# Patient Record
Sex: FEMALE | URBAN - METROPOLITAN AREA
[De-identification: names, ages, dates, MRNs, and addresses within clinical notes are randomized per-mention and may not be internally consistent; named-entity substitution may affect disease eponyms.]

---

## 2020-08-28 ENCOUNTER — OFFICE VISIT (OUTPATIENT)
Dept: FAMILY MEDICINE CLINIC | Facility: CLINIC | Age: 40
End: 2020-08-28
Payer: COMMERCIAL

## 2020-08-28 VITALS
BODY MASS INDEX: 22.86 KG/M2 | OXYGEN SATURATION: 100 % | WEIGHT: 129 LBS | HEART RATE: 80 BPM | HEIGHT: 63 IN | RESPIRATION RATE: 20 BRPM | DIASTOLIC BLOOD PRESSURE: 72 MMHG | SYSTOLIC BLOOD PRESSURE: 118 MMHG

## 2020-08-28 DIAGNOSIS — E55.9 VITAMIN D DEFICIENCY: ICD-10-CM

## 2020-08-28 DIAGNOSIS — Z00.00 ANNUAL PHYSICAL EXAM: Primary | ICD-10-CM

## 2020-08-28 DIAGNOSIS — E03.9 HYPOTHYROIDISM, UNSPECIFIED TYPE: ICD-10-CM

## 2020-08-28 PROCEDURE — 3078F DIAST BP <80 MM HG: CPT | Performed by: FAMILY MEDICINE

## 2020-08-28 PROCEDURE — 99385 PREV VISIT NEW AGE 18-39: CPT | Performed by: FAMILY MEDICINE

## 2020-08-28 PROCEDURE — 3008F BODY MASS INDEX DOCD: CPT | Performed by: FAMILY MEDICINE

## 2020-08-28 PROCEDURE — 3074F SYST BP LT 130 MM HG: CPT | Performed by: FAMILY MEDICINE

## 2020-08-28 RX ORDER — LEVOTHYROXINE SODIUM 50 MCG
TABLET ORAL
COMMUNITY
Start: 2020-07-27 | End: 2020-08-28

## 2020-08-28 RX ORDER — LEVOTHYROXINE SODIUM 50 MCG
50 TABLET ORAL
Qty: 90 TABLET | Refills: 3 | Status: SHIPPED | OUTPATIENT
Start: 2020-08-28 | End: 2021-06-22

## 2020-08-28 NOTE — H&P
HPI:   Giovanni Mahmood is a 47year old female who presents for a well woman exam. Symptoms: denies discharge, itching, burning or dysuria. Patient's last menstrual period was 08/21/2020.   Previous pap: UTD  Performs SBEs:yes                        Pt co denies excessive thirst, denies significant weight change  ALL/ASTHMA: denies hx of allergy or asthma    EXAM:   /72   Pulse 80   Resp 20   Ht 63\"   Wt 129 lb (58.5 kg)   LMP 08/21/2020   SpO2 100%   BMI 22.85 kg/m²       Body mass index is 22.85 kg

## 2021-06-22 DIAGNOSIS — E03.9 HYPOTHYROIDISM, UNSPECIFIED TYPE: ICD-10-CM

## 2021-06-24 ENCOUNTER — TELEPHONE (OUTPATIENT)
Dept: FAMILY MEDICINE CLINIC | Facility: CLINIC | Age: 41
End: 2021-06-24

## 2021-06-24 RX ORDER — LEVOTHYROXINE SODIUM 50 MCG
50 TABLET ORAL
Qty: 90 TABLET | Refills: 0 | Status: SHIPPED | OUTPATIENT
Start: 2021-06-24 | End: 2021-09-14

## 2021-06-24 NOTE — TELEPHONE ENCOUNTER
Jamaal Ibarra from [employer] wants to offer the patient synthroid brand name with the cost of a generic copy     Please advise ref # [id number]

## 2021-06-24 NOTE — TELEPHONE ENCOUNTER
Called back. Spoke to [employer] and said they will waive the brand fee and provide pt brand Synthroid at generic cost. I gave verbal auth for it.

## 2021-09-13 DIAGNOSIS — E03.9 HYPOTHYROIDISM, UNSPECIFIED TYPE: ICD-10-CM

## 2021-09-13 NOTE — TELEPHONE ENCOUNTER
SYNTHROID 50 MCG Oral Tab       Missed her August appt. Can't get in until the end of October.   She needs [facility]

## 2021-09-14 RX ORDER — LEVOTHYROXINE SODIUM 50 MCG
50 TABLET ORAL
Qty: 90 TABLET | Refills: 0 | Status: SHIPPED | OUTPATIENT
Start: 2021-09-14 | End: 2021-09-25

## 2021-09-24 DIAGNOSIS — E03.9 HYPOTHYROIDISM, UNSPECIFIED TYPE: ICD-10-CM

## 2021-09-25 RX ORDER — LEVOTHYROXINE SODIUM 50 MCG
50 TABLET ORAL
Qty: 90 TABLET | Refills: 0 | Status: SHIPPED | OUTPATIENT
Start: 2021-09-25 | End: 2022-01-11

## 2021-10-27 ENCOUNTER — OFFICE VISIT (OUTPATIENT)
Dept: FAMILY MEDICINE CLINIC | Facility: CLINIC | Age: 41
End: 2021-10-27
Payer: COMMERCIAL

## 2021-10-27 VITALS
BODY MASS INDEX: 25.35 KG/M2 | WEIGHT: 136 LBS | OXYGEN SATURATION: 98 % | RESPIRATION RATE: 16 BRPM | SYSTOLIC BLOOD PRESSURE: 110 MMHG | HEIGHT: 61.5 IN | HEART RATE: 77 BPM | DIASTOLIC BLOOD PRESSURE: 66 MMHG

## 2021-10-27 DIAGNOSIS — Z12.31 ENCOUNTER FOR SCREENING MAMMOGRAM FOR HIGH-RISK PATIENT: ICD-10-CM

## 2021-10-27 DIAGNOSIS — E56.9 VITAMIN DEFICIENCY: ICD-10-CM

## 2021-10-27 DIAGNOSIS — Z00.00 ANNUAL PHYSICAL EXAM: Primary | ICD-10-CM

## 2021-10-27 PROCEDURE — 99395 PREV VISIT EST AGE 18-39: CPT | Performed by: FAMILY MEDICINE

## 2021-10-27 PROCEDURE — 3074F SYST BP LT 130 MM HG: CPT | Performed by: FAMILY MEDICINE

## 2021-10-27 PROCEDURE — 3078F DIAST BP <80 MM HG: CPT | Performed by: FAMILY MEDICINE

## 2021-10-27 PROCEDURE — 3008F BODY MASS INDEX DOCD: CPT | Performed by: FAMILY MEDICINE

## 2021-10-27 NOTE — PROGRESS NOTES
HPI:   Sanket Zamarripa is a 46year old female who presents for a complete physical exam.       Wt Readings from Last 6 Encounters:  10/27/21 : 136 lb (61.7 kg)  08/28/20 : 129 lb (58.5 kg)    Body mass index is 25.28 kg/m².      No results found for: CHOL nourished,in no apparent distress  CARDIO: RRR without murmur  LUNGS: clear to auscultation  NECK: supple,no adenopathy,no thyromegally  HEENT: atraumatic, normocephalic,ears and throat are clear  EYES:PERRLA, EOMI, normal,conjunctiva are clear  SKIN: Sheldon Jose

## 2022-01-11 DIAGNOSIS — E03.9 HYPOTHYROIDISM, UNSPECIFIED TYPE: ICD-10-CM

## 2022-01-11 LAB
ABSOLUTE BASOPHILS: 33 CELLS/UL (ref 0–200)
ABSOLUTE EOSINOPHILS: 119 CELLS/UL (ref 15–500)
ABSOLUTE LYMPHOCYTES: 2765 CELLS/UL (ref 850–3900)
ABSOLUTE MONOCYTES: 442 CELLS/UL (ref 200–950)
ABSOLUTE NEUTROPHILS: 3241 CELLS/UL (ref 1500–7800)
ALBUMIN/GLOBULIN RATIO: 1.5 (CALC) (ref 1–2.5)
ALBUMIN: 4.5 G/DL (ref 3.6–5.1)
ALKALINE PHOSPHATASE: 100 U/L (ref 31–125)
ALT: 13 U/L (ref 6–29)
AST: 16 U/L (ref 10–30)
BASOPHILS: 0.5 %
BILIRUBIN, TOTAL: 0.6 MG/DL (ref 0.2–1.2)
BUN: 11 MG/DL (ref 7–25)
CALCIUM: 9.6 MG/DL (ref 8.6–10.2)
CARBON DIOXIDE: 26 MMOL/L (ref 20–32)
CHLORIDE: 103 MMOL/L (ref 98–110)
CHOL/HDLC RATIO: 5.3 (CALC)
CHOLESTEROL, TOTAL: 227 MG/DL
CREATININE: 0.79 MG/DL (ref 0.5–1.1)
EGFR IF AFRICN AM: 109 ML/MIN/1.73M2
EGFR IF NONAFRICN AM: 94 ML/MIN/1.73M2
EOSINOPHILS: 1.8 %
GLOBULIN: 3 G/DL (CALC) (ref 1.9–3.7)
GLUCOSE: 81 MG/DL (ref 65–99)
HDL CHOLESTEROL: 43 MG/DL
HEMATOCRIT: 30 % (ref 35–45)
HEMOGLOBIN: 10.1 G/DL (ref 11.7–15.5)
LDL-CHOLESTEROL: 161 MG/DL (CALC)
LYMPHOCYTES: 41.9 %
MCH: 26.6 PG (ref 27–33)
MCHC: 33.7 G/DL (ref 32–36)
MCV: 78.9 FL (ref 80–100)
MONOCYTES: 6.7 %
MPV: 9.2 FL (ref 7.5–12.5)
NEUTROPHILS: 49.1 %
NON-HDL CHOLESTEROL: 184 MG/DL (CALC)
PLATELET COUNT: 356 THOUSAND/UL (ref 140–400)
POTASSIUM: 3.9 MMOL/L (ref 3.5–5.3)
PROTEIN, TOTAL: 7.5 G/DL (ref 6.1–8.1)
RDW: 13.1 % (ref 11–15)
RED BLOOD CELL COUNT: 3.8 MILLION/UL (ref 3.8–5.1)
SODIUM: 137 MMOL/L (ref 135–146)
T4, FREE: 1.2 NG/DL (ref 0.8–1.8)
TRIGLYCERIDES: 115 MG/DL
TSH: 2.16 MIU/L
VITAMIN B12: 269 PG/ML (ref 200–1100)
VITAMIN D, 25-OH, TOTAL: 11 NG/ML (ref 30–100)
WHITE BLOOD CELL COUNT: 6.6 THOUSAND/UL (ref 3.8–10.8)

## 2022-01-11 RX ORDER — LEVOTHYROXINE SODIUM 50 MCG
50 TABLET ORAL
Qty: 90 TABLET | Refills: 0 | Status: SHIPPED | OUTPATIENT
Start: 2022-01-11

## 2022-01-11 NOTE — TELEPHONE ENCOUNTER
Patient called to check on her refill. Explained Policy  Patient stated she has no medication for tomorrow. Please advise.

## 2022-01-11 NOTE — TELEPHONE ENCOUNTER
Last refill:  SYNTHROID 50 MCG Oral Tab 90 tablet 0 9/25/2021    Sig:   Take 1 tablet (50 mcg total) by mouth before breakfast       Last lab:   1/10/22  9:08 AM   TSH   mIU/L 2.16      Approve/deny:

## 2022-04-11 RX ORDER — LEVOTHYROXINE SODIUM 50 MCG
TABLET ORAL
Qty: 90 TABLET | Refills: 0 | Status: SHIPPED | OUTPATIENT
Start: 2022-04-11

## 2022-04-11 RX ORDER — LEVOTHYROXINE SODIUM 50 MCG
TABLET ORAL
Qty: 90 TABLET | Refills: 0 | Status: SHIPPED | OUTPATIENT
Start: 2022-04-11 | End: 2022-04-11

## 2022-07-11 DIAGNOSIS — E03.9 HYPOTHYROIDISM, UNSPECIFIED TYPE: ICD-10-CM

## 2022-07-11 RX ORDER — LEVOTHYROXINE SODIUM 50 MCG
TABLET ORAL
Qty: 90 TABLET | Refills: 0 | Status: SHIPPED | OUTPATIENT
Start: 2022-07-11

## 2022-10-16 DIAGNOSIS — E03.9 HYPOTHYROIDISM, UNSPECIFIED TYPE: ICD-10-CM

## 2022-10-17 DIAGNOSIS — E03.9 HYPOTHYROIDISM, UNSPECIFIED TYPE: ICD-10-CM

## 2022-10-17 RX ORDER — LEVOTHYROXINE SODIUM 50 MCG
TABLET ORAL
Qty: 90 TABLET | Refills: 0 | Status: SHIPPED | OUTPATIENT
Start: 2022-10-17

## 2022-10-17 RX ORDER — LEVOTHYROXINE SODIUM 50 MCG
TABLET ORAL
Qty: 90 TABLET | Refills: 0 | Status: SHIPPED | OUTPATIENT
Start: 2022-10-17 | End: 2022-10-17

## 2023-07-29 DIAGNOSIS — E03.9 HYPOTHYROIDISM, UNSPECIFIED TYPE: ICD-10-CM

## 2023-07-31 DIAGNOSIS — E03.9 HYPOTHYROIDISM, UNSPECIFIED TYPE: ICD-10-CM

## 2023-07-31 RX ORDER — LEVOTHYROXINE SODIUM 50 MCG
TABLET ORAL
Qty: 90 TABLET | Refills: 0 | OUTPATIENT
Start: 2023-07-31

## 2023-08-02 RX ORDER — LEVOTHYROXINE SODIUM 50 MCG
50 TABLET ORAL
Qty: 90 TABLET | Refills: 0 | OUTPATIENT
Start: 2023-08-02

## 2023-08-24 ENCOUNTER — OFFICE VISIT (OUTPATIENT)
Dept: FAMILY MEDICINE CLINIC | Facility: CLINIC | Age: 43
End: 2023-08-24
Payer: COMMERCIAL

## 2023-08-24 VITALS
HEIGHT: 62 IN | HEART RATE: 86 BPM | WEIGHT: 134 LBS | RESPIRATION RATE: 16 BRPM | SYSTOLIC BLOOD PRESSURE: 118 MMHG | OXYGEN SATURATION: 100 % | DIASTOLIC BLOOD PRESSURE: 76 MMHG | BODY MASS INDEX: 24.66 KG/M2

## 2023-08-24 DIAGNOSIS — E03.9 HYPOTHYROIDISM, UNSPECIFIED TYPE: ICD-10-CM

## 2023-08-24 DIAGNOSIS — Z12.31 VISIT FOR SCREENING MAMMOGRAM: ICD-10-CM

## 2023-08-24 DIAGNOSIS — Z12.4 SCREENING FOR CERVICAL CANCER: ICD-10-CM

## 2023-08-24 DIAGNOSIS — Z00.00 ROUTINE GENERAL MEDICAL EXAMINATION AT A HEALTH CARE FACILITY: Primary | ICD-10-CM

## 2023-08-24 NOTE — PROGRESS NOTES
Subjective:   Patient ID: Milo Fregoso is a 41year old female. HPI  Patient presents today requesting physical exam.      Diet: well balanced, vegetarian  Exercise: regular activity, walking  Tobacco use: denies  Drug use: denies  Alcohol use: denies  Sexually active: with spouse  LMP: 8/10/23  Marital status: , 2 children ages 8 and 9  Occupation:     Health maintenance:  Pap/Hpv: 2/2/18 negative  Mammogram: never  Performs SBEs: yes  Dexa scan: never  Colonoscopy: never  Discussed age appropriate vaccines    Ran out of thyroid med 3 weeks ago  Was originally placed on the med to help with fertility and just never stopped it afterward  Wonders if she could stay off of it      History/Other:   Review of Systems   Constitutional:  Negative for chills, fatigue and fever. HENT:  Negative for congestion, ear pain, rhinorrhea and sore throat. Eyes:  Negative for visual disturbance. Respiratory:  Negative for cough, shortness of breath and wheezing. Cardiovascular:  Negative for chest pain, palpitations and leg swelling. Gastrointestinal:  Negative for abdominal pain, diarrhea, nausea and vomiting. Genitourinary:  Negative for dysuria, frequency and hematuria. Musculoskeletal:  Negative for arthralgias, gait problem and myalgias. Skin:  Negative for rash. Neurological:  Negative for weakness, light-headedness and headaches. Hematological:  Negative for adenopathy. Psychiatric/Behavioral:  Negative for dysphoric mood. The patient is not nervous/anxious. Current Outpatient Medications   Medication Sig Dispense Refill    SYNTHROID 50 MCG Oral Tab TAKE 1 TABLET(50 MCG) BY MOUTH BEFORE BREAKFAST 90 tablet 0     Allergies:No Known Allergies    Objective:   Physical Exam  Vitals and nursing note reviewed. Constitutional:       General: She is not in acute distress. Appearance: She is well-developed. HENT:      Head: Normocephalic and atraumatic.       Right Ear: Tympanic membrane and external ear normal.      Left Ear: Tympanic membrane and external ear normal.      Nose: Nose normal.      Mouth/Throat:      Mouth: Mucous membranes are moist.   Eyes:      Extraocular Movements: Extraocular movements intact. Conjunctiva/sclera: Conjunctivae normal.      Pupils: Pupils are equal, round, and reactive to light. Neck:      Thyroid: No thyromegaly. Cardiovascular:      Rate and Rhythm: Normal rate and regular rhythm. Heart sounds: Normal heart sounds. Pulmonary:      Effort: Pulmonary effort is normal.      Breath sounds: Normal breath sounds. No wheezing or rales. Abdominal:      General: Bowel sounds are normal. There is no distension. Palpations: Abdomen is soft. There is no mass. Tenderness: There is no abdominal tenderness. Musculoskeletal:         General: No tenderness. Normal range of motion. Cervical back: Normal range of motion and neck supple. Lymphadenopathy:      Cervical: No cervical adenopathy. Skin:     General: Skin is warm and dry. Findings: No rash. Neurological:      General: No focal deficit present. Mental Status: She is alert and oriented to person, place, and time. Psychiatric:         Mood and Affect: Mood normal.         Behavior: Behavior normal.         Assessment & Plan:   1. Routine general medical examination at a health care facility  Patient is generally healthy. Physical exam is unremarkable. Check fasting labs. Health maintenance issues discussed. Encouraged routine vaccines. Advised healthy diet and regular exercise. Annual physicals. - CBC WITH DIFFERENTIAL WITH PLATELET  - COMP METABOLIC PANEL (14)  - LIPID PANEL  - TSH W REFLEX TO FREE T4  - VITAMIN B12  - VITAMIN D, 25-HYDROXY    2. Screening for cervical cancer  - THINPREP PAP AND HPV MRNA E6/E7 REFLEX HPV 16,18/45    3. Hypothyroidism, unspecified type  Recheck TSH and if normal remain off medication.      4. Visit for screening mammogram  - [facility] ELIZ 2D+3D SCREENING BILAT (CPT=77067/79906);  Future

## 2023-08-29 LAB
HPV 16 RNA: NOT DETECTED
HPV 18/45 RNA: NOT DETECTED
HPV MRNA E6/E7: DETECTED

## 2024-10-10 ENCOUNTER — OFFICE VISIT (OUTPATIENT)
Dept: FAMILY MEDICINE CLINIC | Facility: CLINIC | Age: 44
End: 2024-10-10
Payer: COMMERCIAL

## 2024-10-10 VITALS
BODY MASS INDEX: 24.84 KG/M2 | RESPIRATION RATE: 16 BRPM | WEIGHT: 135 LBS | DIASTOLIC BLOOD PRESSURE: 74 MMHG | SYSTOLIC BLOOD PRESSURE: 108 MMHG | HEART RATE: 73 BPM | HEIGHT: 62 IN | OXYGEN SATURATION: 100 %

## 2024-10-10 DIAGNOSIS — B97.7 HPV (HUMAN PAPILLOMA VIRUS) INFECTION: ICD-10-CM

## 2024-10-10 DIAGNOSIS — Z00.00 ANNUAL PHYSICAL EXAM: Primary | ICD-10-CM

## 2024-10-10 DIAGNOSIS — Z12.31 ENCOUNTER FOR SCREENING MAMMOGRAM FOR HIGH-RISK PATIENT: ICD-10-CM

## 2024-10-10 DIAGNOSIS — Z01.419 WELL WOMAN EXAM WITH ROUTINE GYNECOLOGICAL EXAM: ICD-10-CM

## 2024-10-10 DIAGNOSIS — R01.1 NEWLY RECOGNIZED HEART MURMUR: ICD-10-CM

## 2024-10-10 PROCEDURE — 99396 PREV VISIT EST AGE 40-64: CPT | Performed by: FAMILY MEDICINE

## 2024-10-10 NOTE — PROGRESS NOTES
HPI:   Rin Hammond is a 44 year old female who presents for a complete physical exam.       Wt Readings from Last 6 Encounters:   10/10/24 135 lb (61.2 kg)   08/24/23 134 lb (60.8 kg)   10/27/21 136 lb (61.7 kg)   08/28/20 129 lb (58.5 kg)     Body mass index is 24.69 kg/m².     CHOLESTEROL, TOTAL (mg/dL)   Date Value   01/10/2022 227 (H)     HDL CHOLESTEROL (mg/dL)   Date Value   01/10/2022 43 (L)     LDL-CHOLESTEROL (mg/dL (calc))   Date Value   01/10/2022 161 (H)     AST (U/L)   Date Value   01/10/2022 16     ALT (U/L)   Date Value   01/10/2022 13     last pap- due   H/o HPV   No vaginal discharge  No bladder dysfunction  Regular menses  birth control-condoms   + performing self breast exams  last mammogram- due   Dexa - never   C-scope - never   No calcium and vit D supplementation  No family history of breast colon or ovarian cancer      Current Outpatient Medications   Medication Sig Dispense Refill    SYNTHROID 50 MCG Oral Tab TAKE 1 TABLET(50 MCG) BY MOUTH BEFORE BREAKFAST 90 tablet 0      No past medical history on file.   No past surgical history on file.   No family history on file.   Social History:   Social History     Socioeconomic History    Marital status:    Tobacco Use    Smoking status: Never    Smokeless tobacco: Never   Substance and Sexual Activity    Alcohol use: Never    Drug use: Never     Occ: . : . Children: 2    Exercise: minimal.  Diet: watches minimally     REVIEW OF SYSTEMS:   GENERAL: feels well otherwise  SKIN: denies any unusual skin lesions  EYES:denies blurred vision or double vision  HEENT: denies nasal congestion, sinus pain or ST  LUNGS: denies shortness of breath with exertion  CARDIOVASCULAR: denies chest pain on exertion  GI: denies abdominal pain,denies heartburn  : denies dysuria, vaginal discharge or itching,periods regular   MUSCULOSKELETAL: denies back pain  NEURO: denies headaches  PSYCHE: denies depression or anxiety  HEMATOLOGIC:  denies hx of anemia  ENDOCRINE: thyroid history; WENT OFF MEDS IN JULY   ALL/ASTHMA: denies hx of allergy or asthma    EXAM:   /74   Pulse 73   Resp 16   Ht 5' 2\" (1.575 m)   Wt 135 lb (61.2 kg)   LMP 10/01/2024   SpO2 100%   BMI 24.69 kg/m²   Body mass index is 24.69 kg/m².   GENERAL: alert and oriented X 3, well developed, well nourished,in no apparent distress  CARDIO: RRR 3/6 murmur  LUNGS: clear to auscultation  NECK: supple,no adenopathy,no thyromegally  HEENT: atraumatic, normocephalic,ears and throat are clear  EYES:PERRLA, EOMI, normal,conjunctiva are clear  SKIN: norashes,no suspicious lesions  GI: good BS's,no masses, HSM or tenderness  CHEST: no chest tenderness  BREAST: no axillary LAD, no masses no nipple discharge bilaterally  : external genitalia - no inguinal LAD, no lesions.    Speculum exam- introitus is normal,scant discharge,cervix is pink.   Bimanual exam- no adnexal masses or tenderness  RECTAL:good rectal tone,no mass, brown stool, stool is OB negative   MUSCULOSKELETAL: back is not tender,FROM of the back  EXTREMITIES: no cyanosis, clubbing or edema  NEURO: cranial nerves are intact,motor and sensory are grossly intact    ASSESSMENT AND PLAN:   Rin Hammond is a 44 year old female who presents     1. Annual physical exam    - VITAMIN D, 25-HYDROXY [17167][Q]  - FERRITIN [457] [Q]  - Free T4, (Free Thyroxine)  - Assay, Thyroid Stim Hormone  - Lipid Panel  - CBC With Differential With Platelet  - Vitamin B12  - Comp Metabolic Panel (14)  - Hemoglobin A1C    2. Well woman exam with routine gynecological exam    3. HPV (human papilloma virus) infection    4. Encounter for screening mammogram for high-risk patient    - [facility] ELIZ 2D+3D SCREENING BILAT (CPT=77067/56689); Future      OPEN TO THYROID MEDS AGAIN / check echo     Discussed diet, exercise,calcium, vitamin D, fish oil and self breast exams.   Questions answered and patient indicates understanding of these issues and  agrees to the plan.  Follow up in 1 year or sooner if needed.

## 2024-10-15 LAB
ABSOLUTE BASOPHILS: 39 CELLS/UL (ref 0–200)
ABSOLUTE EOSINOPHILS: 59 CELLS/UL (ref 15–500)
ABSOLUTE LYMPHOCYTES: 2756 CELLS/UL (ref 850–3900)
ABSOLUTE MONOCYTES: 299 CELLS/UL (ref 200–950)
ABSOLUTE NEUTROPHILS: 3348 CELLS/UL (ref 1500–7800)
ALBUMIN/GLOBULIN RATIO: 1.5 (CALC) (ref 1–2.5)
ALBUMIN: 4.5 G/DL (ref 3.6–5.1)
ALKALINE PHOSPHATASE: 113 U/L (ref 31–125)
ALT: 9 U/L (ref 6–29)
AST: 12 U/L (ref 10–30)
BASOPHILS: 0.6 %
BILIRUBIN, TOTAL: 0.4 MG/DL (ref 0.2–1.2)
BUN: 12 MG/DL (ref 7–25)
CALCIUM: 9.7 MG/DL (ref 8.6–10.2)
CARBON DIOXIDE: 25 MMOL/L (ref 20–32)
CHLORIDE: 103 MMOL/L (ref 98–110)
CHOL/HDLC RATIO: 5.9 (CALC)
CHOLESTEROL, TOTAL: 241 MG/DL
CREATININE: 0.63 MG/DL (ref 0.5–0.99)
EGFR: 114 ML/MIN/1.73M2
EOSINOPHILS: 0.9 %
FERRITIN: 2 NG/ML (ref 16–232)
GLOBULIN: 3.1 G/DL (CALC) (ref 1.9–3.7)
GLUCOSE: 82 MG/DL (ref 65–99)
HDL CHOLESTEROL: 41 MG/DL
HEMATOCRIT: 30.7 % (ref 35–45)
HEMOGLOBIN A1C: 5.8 % OF TOTAL HGB
HEMOGLOBIN: 9 G/DL (ref 11.7–15.5)
HPV MRNA E6/E7: NOT DETECTED
LDL-CHOLESTEROL: 178 MG/DL (CALC)
LYMPHOCYTES: 42.4 %
MCH: 21.3 PG (ref 27–33)
MCHC: 29.3 G/DL (ref 32–36)
MCV: 72.7 FL (ref 80–100)
MONOCYTES: 4.6 %
MPV: 9.1 FL (ref 7.5–12.5)
NEUTROPHILS: 51.5 %
NON-HDL CHOLESTEROL: 200 MG/DL (CALC)
PLATELET COUNT: 439 THOUSAND/UL (ref 140–400)
POTASSIUM: 4.2 MMOL/L (ref 3.5–5.3)
PROTEIN, TOTAL: 7.6 G/DL (ref 6.1–8.1)
RDW: 16.1 % (ref 11–15)
RED BLOOD CELL COUNT: 4.22 MILLION/UL (ref 3.8–5.1)
SODIUM: 137 MMOL/L (ref 135–146)
T4, FREE: 1.1 NG/DL (ref 0.8–1.8)
TRIGLYCERIDES: 102 MG/DL
TSH: 2.42 MIU/L
VITAMIN B12: 315 PG/ML (ref 200–1100)
VITAMIN D, 25-OH, TOTAL: 8 NG/ML (ref 30–100)
WHITE BLOOD CELL COUNT: 6.5 THOUSAND/UL (ref 3.8–10.8)

## 2024-10-25 ENCOUNTER — TELEMEDICINE (OUTPATIENT)
Dept: FAMILY MEDICINE CLINIC | Facility: CLINIC | Age: 44
End: 2024-10-25
Payer: COMMERCIAL

## 2024-10-25 DIAGNOSIS — E78.00 ELEVATED CHOLESTEROL: ICD-10-CM

## 2024-10-25 DIAGNOSIS — R73.03 PREDIABETES: ICD-10-CM

## 2024-10-25 DIAGNOSIS — E55.9 VITAMIN D DEFICIENCY: ICD-10-CM

## 2024-10-25 DIAGNOSIS — E53.8 B12 DEFICIENCY: ICD-10-CM

## 2024-10-25 DIAGNOSIS — D50.9 IRON DEFICIENCY ANEMIA, UNSPECIFIED IRON DEFICIENCY ANEMIA TYPE: Primary | ICD-10-CM

## 2024-10-25 PROCEDURE — 99214 OFFICE O/P EST MOD 30 MIN: CPT | Performed by: FAMILY MEDICINE

## 2024-10-25 NOTE — PROGRESS NOTES
This visit is conducted using Telemedicine with live, interactive video and audio.    Telehealth outside of [facility]  Telehealth Verbal Consent   I conducted a telehealth visit with Rin Hammond today, 10/25/24, which was completed using two-way, real-time interactive audio and video communication. This has been done in good naomy to provide continuity of care in the best interest of the provider-patient relationship, due to the COVID -19 public health crisis/national emergency where restrictions of face-to-face office visits are ongoing. Every conscious effort was taken to allow for sufficient and adequate time to complete the visit.  The patient was made aware of the limitations of the telehealth visit, including treatment limitations as no physical exam could be performed.  The patient was advised to call 911 or to go to the ER in case there was an emergency.  The patient was also advised of the potential privacy & security concerns related to the telehealth platform.   The patient was made aware of where to find [facility]'s notice of privacy practices, telehealth consent form and other related consent forms and documents.  which are located on the [facility] website. The patient verbally agreed to telehealth consent form, related consents and the risks discussed.    Lastly, the patient confirmed that they were in [place].   Included in this visit, time may have been spent reviewing labs, medications, radiology tests and decision making. Appropriate medical decision-making and tests are ordered as detailed in the plan of care above.  Coding/billing information is submitted for this visit based on complexity of care and/or time spent for the visit.      HPI:   Rin Hammond is a 44 year old female who presents for lab follow up    Pt does not feel menstrual cycle is too heavy   Pt walking daily   Pt feel her energy is ok   Not taking any supplements       Component      Latest Ref Rng 1/10/2022 10/14/2024   WBC      3.8  - 10.8 Thousand/uL 6.6  6.5    RBC      3.80 - 5.10 Million/uL 3.80  4.22    Hemoglobin      11.7 - 15.5 g/dL 10.1 (L)  9.0 (L)    Hematocrit      35.0 - 45.0 % 30.0 (L)  30.7 (L)    MCV      80.0 - 100.0 fL 78.9 (L)  72.7 (L)    MCH      27.0 - 33.0 pg 26.6 (L)  21.3 (L)    MCHC      32.0 - 36.0 g/dL 33.7  29.3 (L)    RDW      11.0 - 15.0 % 13.1  16.1 (H)    Platelet Count      140 - 400 Thousand/uL 356  439 (H)    MPV      7.5 - 12.5 fL 9.2  9.1    Neutrophils Absolute      1,500 - 7,800 cells/uL 3,241  3,348    Lymphocytes Absolute      850 - 3,900 cells/uL 2,765  2,756    Monocytes Absolute      200 - 950 cells/uL 442  299    Eosinophils Absolute      15 - 500 cells/uL 119  59    Basophils Absolute      0 - 200 cells/uL 33  39    Neutrophils %      % 49.1  51.5    Lymphocytes %      % 41.9  42.4    Monocytes %      % 6.7  4.6    Eosinophils %      % 1.8  0.9    Basophils %      % 0.5  0.6    Glucose      65 - 99 mg/dL 81  82    BUN      7 - 25 mg/dL 11  12    CREATININE      0.50 - 0.99 mg/dL 0.79  0.63    eGFR NON-AFR. AMERICAN      > OR = 60 mL/min/1.73m2 94     eGFR       > OR = 60 mL/min/1.73m2 109     BUN/CREATININE RATIO      6 - 22 (calc) NOT APPLICABLE  SEE NOTE:    Sodium      135 - 146 mmol/L 137  137    Potassium      3.5 - 5.3 mmol/L 3.9  4.2    Chloride      98 - 110 mmol/L 103  103    Carbon Dioxide, Total      20 - 32 mmol/L 26  25    CALCIUM      8.6 - 10.2 mg/dL 9.6  9.7    PROTEIN, TOTAL      6.1 - 8.1 g/dL 7.5  7.6    Albumin      3.6 - 5.1 g/dL 4.5  4.5    Globulin      1.9 - 3.7 g/dL (calc) 3.0  3.1    A/G Ratio      1.0 - 2.5 (calc) 1.5  1.5    Total Bilirubin      0.2 - 1.2 mg/dL 0.6  0.4    Alkaline Phosphatase      31 - 125 U/L 100  113    AST (SGOT)      10 - 30 U/L 16  12    ALT (SGPT)      6 - 29 U/L 13  9    EGFR      > OR = 60 mL/min/1.73m2  114    Cholesterol, Total      <200 mg/dL 227 (H)  241 (H)    HDL Cholesterol      > OR = 50 mg/dL 43 (L)  41 (L)     Triglycerides      <150 mg/dL 115  102    LDL Cholesterol Calc      mg/dL (calc) 161 (H)  178 (H)    Chol/HDL Ratio      <5.0 (calc) 5.3 (H)  5.9 (H)    NON-HDL CHOLESTEROL      <130 mg/dL (calc) 184 (H)  200 (H)    T4,Free (Direct)      0.8 - 1.8 ng/dL 1.2  1.1    TSH      mIU/L 2.16  2.42    Vitamin B12      200 - 1,100 pg/mL 269  315    VITAMIN D, 25-OH, TOTAL      30 - 100 ng/mL 11 (L)  8 (L)    FERRITIN      16 - 232 ng/mL  2 (L)    HEMOGLOBIN A1c      <5.7 % of total Hgb  5.8 (H)       Legend:  (L) Low  (H) High    Current Outpatient Medications   Medication Sig Dispense Refill    SYNTHROID 50 MCG Oral Tab TAKE 1 TABLET(50 MCG) BY MOUTH BEFORE BREAKFAST 90 tablet 0      No past medical history on file.   No past surgical history on file.   No family history on file.   Social History:   Social History     Socioeconomic History    Marital status:    Tobacco Use    Smoking status: Never    Smokeless tobacco: Never   Substance and Sexual Activity    Alcohol use: Never    Drug use: Never     Occ: . : . Children: 2    Exercise: minimal.  Diet: watches minimally     REVIEW OF SYSTEMS:   GENERAL: feels well otherwise  SKIN: denies any unusual skin lesions  EYES:denies blurred vision or double vision  HEENT: denies nasal congestion, sinus pain or ST  LUNGS: denies shortness of breath with exertion  CARDIOVASCULAR: denies chest pain on exertion  GI: denies abdominal pain,denies heartburn  : denies dysuria, vaginal discharge or itching,periods regular   MUSCULOSKELETAL: denies back pain  NEURO: denies headaches  PSYCHE: denies depression or anxiety  HEMATOLOGIC: denies hx of anemia  ENDOCRINE: thyroid history; WENT OFF MEDS IN JULY   ALL/ASTHMA: denies hx of allergy or asthma    EXAM:   alert, appears stated age and cooperative, Normocephalic, without obvious abnormality, atraumatic, lips, mucosa, and tongue normal; teeth and gums normal, Speaking in full sentences comfortably, Normal  work of breathing, Skin color, texture, turgor normal. No rashes or lesions and age appropriate, normal, logical connections, person, place and time/date and no suicidal ideation      ASSESSMENT AND PLAN:   Rin Hammond is a 44 year old female who presents   1. Iron deficiency anemia, unspecified iron deficiency anemia type    - DIETITIAN EDUCATION INITIAL, DIET (INTERNAL)  - Oncology/Hematology Referral - In Network  - [facility] US PELVIS (TRANSABDOMINAL AND TRANSVAGINAL) (CPT=76856/21257) [[id number]]; Future  - CBC With Differential With Platelet  - FERRITIN [457] [Q]  - [facility] US PELVIS (TRANSABDOMINAL AND TRANSVAGINAL) (CPT=76856/91438) [[id number]]    2. B12 deficiency  RN visit for monthly B12 injections   - DIETITIAN EDUCATION INITIAL, DIET (INTERNAL)  - Vitamin B12    3. Vitamin D deficiency    - DIETITIAN EDUCATION INITIAL, DIET (INTERNAL)  - VITAMIN D, 25-HYDROXY [71667][Q]    4. Elevated cholesterol    - DIETITIAN EDUCATION INITIAL, DIET (INTERNAL)  - Lipid Panel    5. Prediabetes    - DIETITIAN EDUCATION INITIAL, DIET (INTERNAL)  - Hemoglobin A1C    Labs in 3 months     Questions answered and patient indicates understanding of these issues and agrees to the plan.  Follow up in 3-6 mo or sooner if needed.

## 2024-12-06 ENCOUNTER — TELEPHONE (OUTPATIENT)
Dept: FAMILY MEDICINE CLINIC | Facility: CLINIC | Age: 44
End: 2024-12-06

## 2024-12-06 NOTE — TELEPHONE ENCOUNTER
Per Dr Rush 10/25/24 visit    2. B12 deficiency  RN visit for monthly B12 injections   - DIETITIAN EDUCATION INITIAL, DIET (INTERNAL)  - Vitamin B12

## 2024-12-10 ENCOUNTER — NURSE ONLY (OUTPATIENT)
Dept: FAMILY MEDICINE CLINIC | Facility: CLINIC | Age: 44
End: 2024-12-10
Payer: COMMERCIAL

## 2024-12-10 DIAGNOSIS — E53.8 B12 DEFICIENCY: Primary | ICD-10-CM

## 2024-12-10 PROCEDURE — 96372 THER/PROPH/DIAG INJ SC/IM: CPT | Performed by: FAMILY MEDICINE

## 2024-12-10 RX ORDER — CYANOCOBALAMIN 1000 UG/ML
1000 INJECTION, SOLUTION INTRAMUSCULAR; SUBCUTANEOUS ONCE
Status: COMPLETED | OUTPATIENT
Start: 2024-12-10 | End: 2024-12-10

## 2024-12-10 RX ADMIN — CYANOCOBALAMIN 1000 MCG: 1000 INJECTION, SOLUTION INTRAMUSCULAR; SUBCUTANEOUS at 10:54:00

## 2024-12-19 ENCOUNTER — NURSE ONLY (OUTPATIENT)
Dept: FAMILY MEDICINE CLINIC | Facility: CLINIC | Age: 44
End: 2024-12-19
Payer: COMMERCIAL

## 2024-12-19 DIAGNOSIS — E53.8 B12 DEFICIENCY: Primary | ICD-10-CM

## 2024-12-19 PROCEDURE — 96372 THER/PROPH/DIAG INJ SC/IM: CPT | Performed by: FAMILY MEDICINE

## 2024-12-19 RX ORDER — CYANOCOBALAMIN 1000 UG/ML
1000 INJECTION, SOLUTION INTRAMUSCULAR; SUBCUTANEOUS ONCE
Status: COMPLETED | OUTPATIENT
Start: 2024-12-19 | End: 2024-12-19

## 2024-12-19 RX ADMIN — CYANOCOBALAMIN 1000 MCG: 1000 INJECTION, SOLUTION INTRAMUSCULAR; SUBCUTANEOUS at 11:22:00

## 2024-12-30 ENCOUNTER — APPOINTMENT (OUTPATIENT)
Age: 44
End: 2024-12-30
Attending: INTERNAL MEDICINE
Payer: COMMERCIAL

## 2025-01-14 ENCOUNTER — OFFICE VISIT (OUTPATIENT)
Age: 45
End: 2025-01-14
Attending: INTERNAL MEDICINE
Payer: COMMERCIAL

## 2025-01-14 VITALS
RESPIRATION RATE: 16 BRPM | TEMPERATURE: 98 F | DIASTOLIC BLOOD PRESSURE: 79 MMHG | BODY MASS INDEX: 25.08 KG/M2 | HEIGHT: 62.01 IN | OXYGEN SATURATION: 100 % | WEIGHT: 138 LBS | SYSTOLIC BLOOD PRESSURE: 134 MMHG | HEART RATE: 84 BPM

## 2025-01-14 DIAGNOSIS — D53.8 ANEMIA DUE TO COPPER DEFICIENCY: ICD-10-CM

## 2025-01-14 DIAGNOSIS — D50.9 IRON DEFICIENCY ANEMIA, UNSPECIFIED IRON DEFICIENCY ANEMIA TYPE: ICD-10-CM

## 2025-01-14 DIAGNOSIS — D53.8 ANEMIA DUE TO ZINC DEFICIENCY: Primary | ICD-10-CM

## 2025-01-14 DIAGNOSIS — Z13.29 SCREENING FOR THYROID DISORDER: ICD-10-CM

## 2025-01-14 LAB
ALBUMIN SERPL-MCNC: 4.9 G/DL (ref 3.2–4.8)
ALBUMIN/GLOB SERPL: 1.5 {RATIO} (ref 1–2)
ALP LIVER SERPL-CCNC: 101 U/L
ALT SERPL-CCNC: 10 U/L
ANION GAP SERPL CALC-SCNC: 7 MMOL/L (ref 0–18)
AST SERPL-CCNC: 17 U/L (ref ?–34)
BASOPHILS # BLD AUTO: 0.03 X10(3) UL (ref 0–0.2)
BASOPHILS NFR BLD AUTO: 0.4 %
BILIRUB SERPL-MCNC: 0.5 MG/DL (ref 0.3–1.2)
BUN BLD-MCNC: 10 MG/DL (ref 9–23)
CALCIUM BLD-MCNC: 10.1 MG/DL (ref 8.7–10.6)
CHLORIDE SERPL-SCNC: 105 MMOL/L (ref 98–112)
CO2 SERPL-SCNC: 24 MMOL/L (ref 21–32)
CREAT BLD-MCNC: 0.7 MG/DL
DEPRECATED HBV CORE AB SER IA-ACNC: 2 NG/ML
EGFRCR SERPLBLD CKD-EPI 2021: 111 ML/MIN/1.73M2 (ref 60–?)
EOSINOPHIL # BLD AUTO: 0.05 X10(3) UL (ref 0–0.7)
EOSINOPHIL NFR BLD AUTO: 0.7 %
ERYTHROCYTE [DISTWIDTH] IN BLOOD BY AUTOMATED COUNT: 16.5 %
FOLATE SERPL-MCNC: 15.6 NG/ML (ref 5.4–?)
GLOBULIN PLAS-MCNC: 3.3 G/DL (ref 2–3.5)
GLUCOSE BLD-MCNC: 82 MG/DL (ref 70–99)
HCT VFR BLD AUTO: 29 %
HGB BLD-MCNC: 8.6 G/DL
HGB RETIC QN AUTO: 21.5 PG (ref 28.2–36.6)
IMM GRANULOCYTES # BLD AUTO: 0.03 X10(3) UL (ref 0–1)
IMM GRANULOCYTES NFR BLD: 0.4 %
IMM RETICS NFR: 0.34 RATIO (ref 0.1–0.3)
IRON SATN MFR SERPL: 5 %
IRON SERPL-MCNC: 26 UG/DL
LYMPHOCYTES # BLD AUTO: 2.65 X10(3) UL (ref 1–4)
LYMPHOCYTES NFR BLD AUTO: 34.8 %
MCH RBC QN AUTO: 21.3 PG (ref 26–34)
MCHC RBC AUTO-ENTMCNC: 29.7 G/DL (ref 31–37)
MCV RBC AUTO: 72 FL
MONOCYTES # BLD AUTO: 0.4 X10(3) UL (ref 0.1–1)
MONOCYTES NFR BLD AUTO: 5.3 %
NEUTROPHILS # BLD AUTO: 4.45 X10 (3) UL (ref 1.5–7.7)
NEUTROPHILS # BLD AUTO: 4.45 X10(3) UL (ref 1.5–7.7)
NEUTROPHILS NFR BLD AUTO: 58.4 %
OSMOLALITY SERPL CALC.SUM OF ELEC: 280 MOSM/KG (ref 275–295)
PLATELET # BLD AUTO: 396 10(3)UL (ref 150–450)
POTASSIUM SERPL-SCNC: 3.5 MMOL/L (ref 3.5–5.1)
PROT SERPL-MCNC: 8.2 G/DL (ref 5.7–8.2)
RBC # BLD AUTO: 4.03 X10(6)UL
RETICS # AUTO: 60.5 X10(3) UL (ref 22.5–147.5)
RETICS/RBC NFR AUTO: 1.5 %
SODIUM SERPL-SCNC: 136 MMOL/L (ref 136–145)
T4 FREE SERPL-MCNC: 1.2 NG/DL (ref 0.8–1.7)
TOTAL IRON BINDING CAPACITY: 483 UG/DL (ref 250–425)
TRANSFERRIN SERPL-MCNC: 398 MG/DL (ref 250–380)
TSI SER-ACNC: 4.06 UIU/ML (ref 0.55–4.78)
VIT B12 SERPL-MCNC: 401 PG/ML (ref 211–911)
WBC # BLD AUTO: 7.6 X10(3) UL (ref 4–11)

## 2025-01-14 NOTE — PATIENT INSTRUCTIONS
Hello,     You were seen today for your diagnosis of iron deficiency anemia. Your ferritin (storage of iron) is low and you need IV iron.     I have ordered 1 dose of IV Iron Dextran (INFeD) for you. Please schedule this once insurance approves the IV iron (our team will call you to make the appointment).    Please schedule a follow up appointment to see me again 4-6 weeks after your infusion.      Thank you!     It was a pleasure meeting you.  Dr. Sabillon

## 2025-01-14 NOTE — PROGRESS NOTES
Pt here for consult regarding iron deficiency, Medications and medical history reviewed. Pt reports minimal fatigue, two episodes of dizziness over the last two months. Pt reports her periods are regular and light. No blood in stool/per rectum. She has gotten Vit B12 shots x2 in December. She is not on any iron supplementation.         Education Record    Learner:  Patient    Disease / Diagnosis: iron deficiency    Barriers / Limitations:  None   Comments:    Method:  Discussion   Comments:    General Topics:  Medication, Side effects and symptom management, and Plan of care reviewed   Comments:    Outcome:  Observed demonstration and Shows understanding   Comments:

## 2025-01-14 NOTE — CONSULTS
Hematology/Oncology Initial Consultation Note    Patient Name: Rin Hammond  Medical Record Number: [id number]    YOB: 1980   Date of Consultation: 1/14/2025     Reason for Consultation/Chief Complaint:  iron deficiency anemia  Physician requesting consultation: Tyler Danielson DO      History of Present Illness:  Mrs. Hammond is a 44 year old F with PMHx of iron deficiency anemia (not on treatment) who presents to hematology/oncology today for evaluation and treatment recommendations of anemia.     Labs 10/14/24 show Hb 9.0 g/dL, platelet 439, MCV 72.7, RDW 16, ferritin 2, B12 315. She got two Vit B12 Injections in Dec 2024.   Over the last 2 months she's had 2 episodes of dizziness. Lightheadedness comes abruptly. No syncope. Otherwise she is asymptomatic.     She is vegetarian.     She had menorrhagia before kids, but after delivering she has been having normal menstrual cycles (for 2 years). When she was younger she used to faint.       Past Medical History:  History reviewed. No pertinent past medical history.    Past Surgical History:  History reviewed. No pertinent surgical history.    Current Outpatient Medications:  Medications Ordered Prior to Encounter[1]    Allergies:   Allergies[2]    Family Medical History:  History reviewed. No pertinent family history.    Social History:  Social History     Social History Narrative    Not on file     Social History     Socioeconomic History    Marital status:    Tobacco Use    Smoking status: Never    Smokeless tobacco: Never   Substance and Sexual Activity    Alcohol use: Never    Drug use: Never       Review of Systems:  A 10-point ROS was done with pertinent positives and negatives per the HPI.     ECOG Performance Status: 0    Vital Signs:  Height: 157.5 cm (5' 2.01\") (01/14 1321)  Weight: 62.6 kg (138 lb) (01/14 1321)  BSA (Calculated - sq m): 1.63 sq meters (01/14 1321)  Pulse: 84 (01/14 1321)  BP: 134/79 (01/14 1321)  Temp: 97.7 °F  (36.5 °C) (01/14 1321)  Do Not Use - Resp Rate: --  SpO2: 100 % (01/14 1321)  Wt Readings from Last 6 Encounters:   01/14/25 62.6 kg (138 lb)   10/10/24 61.2 kg (135 lb)   08/24/23 60.8 kg (134 lb)   10/27/21 61.7 kg (136 lb)   08/28/20 58.5 kg (129 lb)       Physical Examination:  General: Well-nourished and well-appearing. No acute distress.  Eyes: EOMI, bilateral conjunctiva normal. Sclera anicteric.  ENT: Oropharynx is clear. Mucous membranes moist.  Lymph Nodes: No cervical or supraclavicular lymphadenopathy.  Respiratory: Lungs clear to auscultation bilaterally.  Cardiovascular: Regular rate and rhythm, no murmurs. No lower extremity edema.   GI: Soft, non-tender, non-distended with normoactive bowel sounds. No hepatosplenomegaly.  Heme: normal capillary refill. No ecchymosis, petechiae, or purpura.  Neurological: Alert and oriented. No apparent focal sensory or motor deficits  Skin: no rashes or lesions.  Psychiatric: Normal mood and affect.    Data Review  Laboratory Studies:  No results for input(s): \"WBC\", \"HGB\", \"HCT\", \"PLT\", \"MCV\", \"RDW\", \"NEPRELIM\" in the last 168 hours.  No results for input(s): \"NA\", \"K\", \"CL\", \"CO2\", \"BUN\", \"CREATSERUM\", \"GFRAA\", \"GFRNAA\", \"GLU\", \"CA\", \"PHOS\", \"TP\", \"ALB\", \"ALKPHO\", \"AST\", \"ALT\", \"BILT\" in the last 168 hours.    Invalid input(s): \"MAG\"  No results for input(s): \"PT\", \"INR\", \"PTT\", \"FIB\" in the last 168 hours.    Assessment:   Mrs. Hammond is a 44 year old F with PMHx of iron deficiency anemia (not on treatment) who presents to hematology/oncology today for evaluation and treatment recommendations of anemia.       Plan:  Iron Deficiency Anemia  Labs 10/14/24 show Hb 9.0 g/dL, platelet 439, MCV 72.7, RDW 16, ferritin 2, B12 315. She got two Vit B12 Injections in Dec 2024.   Over the last 2 months she's had 2 episodes of dizziness. Otherwise she is asymptomatic.     Plan:  Labs today: CBC with diff, CMP, ferritin, iron/TIBC, iron saturation, reticulocyte count, Vit  B12, folate, TSH with reflex, copper, zinc, soluble transferrin receptor  Recommended completing all routine health care screening as indicated:   Screening colonoscopy at age 47  OB/GYN follow up for [facility]     Patient is symptomatic from severe iron deficiency anemia (2ng/mL), as per H&P and assessment above.  Today, I discussed with the patient her diagnosis of iron deficiency anemia, and the proposed treatment of IV iron. Benefits and risks were discussed in detail. Written information was also provided. After thorough discussion, patient has consented to start IV Iron Dextran (1 dose, 1000 mg, IV over 1 hour -- with 25 mg test dose to be given prior to infusion).    Orders were placed for IV Iron Dextran. Patient will schedule appointment for IV iron infusion once insurance approval is obtained.    Follow-up in 4-6 weeks after IV iron infusion is completed for labs and visit with me. We will re-check iron studies at that time.         Jeanette Sabillon D.O.  [facility]         [1]   Current Outpatient Medications on File Prior to Visit   Medication Sig Dispense Refill    SYNTHROID 50 MCG Oral Tab TAKE 1 TABLET(50 MCG) BY MOUTH BEFORE BREAKFAST (Patient not taking: Reported on 1/14/2025) 90 tablet 0     Current Facility-Administered Medications on File Prior to Visit   Medication Dose Route Frequency Provider Last Rate Last Admin    [COMPLETED] cyanocobalamin (Vitamin B12) 1000 MCG/ML injection 1,000 mcg  1,000 mcg Intramuscular Once Lata Rush, DO   1,000 mcg at 12/19/24 1122   [2] No Known Allergies

## 2025-01-15 LAB
HGB A2 MFR BLD: 1.9 % (ref 1.5–3.5)
HGB PNL BLD ELPH: 97.7 % (ref 95.5–100)

## 2025-01-17 ENCOUNTER — OFFICE VISIT (OUTPATIENT)
Age: 45
End: 2025-01-17
Attending: INTERNAL MEDICINE
Payer: COMMERCIAL

## 2025-01-17 VITALS
OXYGEN SATURATION: 100 % | SYSTOLIC BLOOD PRESSURE: 109 MMHG | RESPIRATION RATE: 16 BRPM | DIASTOLIC BLOOD PRESSURE: 71 MMHG | TEMPERATURE: 97 F | HEART RATE: 81 BPM

## 2025-01-17 DIAGNOSIS — D50.9 IRON DEFICIENCY ANEMIA, UNSPECIFIED IRON DEFICIENCY ANEMIA TYPE: Primary | ICD-10-CM

## 2025-01-17 LAB
COPPER, PL: 1.55 UG/ML
COPPER, RBC: 1.04 UG/ML
SOL TRANSFERRIN RECEPTOR: 47.4 NMOL/L
ZINC: 56 UG/DL

## 2025-01-17 NOTE — PROGRESS NOTES
Pt here for iron infusion.  Arrives Ambulating independently, accompanied by spouse  Modifications in dose or schedule: No     Frequency of blood return and site check throughout administration: Prior to administration   Discharged to Home, Ambulating independently, accompanied by spouse    Outpatient Oncology Care Plan  Problem list:  anemia  Problems related to:    disease/disease progression  Interventions:  administered iron  Expected outcomes:  optimal lab values  Progress towards outcome:  making progress    Education Record    Learner:  Patient  Barriers / Limitations:  None  Method:  Brief focused  Outcome:  Shows understanding  Comments: Pt tolerated infusion. No c/o. VSS. See flowsheet

## 2025-02-28 ENCOUNTER — OFFICE VISIT (OUTPATIENT)
Age: 45
End: 2025-02-28
Attending: INTERNAL MEDICINE
Payer: COMMERCIAL

## 2025-02-28 VITALS
OXYGEN SATURATION: 99 % | TEMPERATURE: 97 F | HEIGHT: 62.01 IN | SYSTOLIC BLOOD PRESSURE: 125 MMHG | HEART RATE: 70 BPM | RESPIRATION RATE: 18 BRPM | DIASTOLIC BLOOD PRESSURE: 78 MMHG | WEIGHT: 137 LBS | BODY MASS INDEX: 24.89 KG/M2

## 2025-02-28 DIAGNOSIS — D50.9 IRON DEFICIENCY ANEMIA, UNSPECIFIED IRON DEFICIENCY ANEMIA TYPE: ICD-10-CM

## 2025-02-28 LAB
ALBUMIN SERPL-MCNC: 4.8 G/DL (ref 3.2–4.8)
ALBUMIN/GLOB SERPL: 1.5 {RATIO} (ref 1–2)
ALP LIVER SERPL-CCNC: 95 U/L
ALT SERPL-CCNC: 14 U/L
ANION GAP SERPL CALC-SCNC: 7 MMOL/L (ref 0–18)
AST SERPL-CCNC: 19 U/L (ref ?–34)
BASOPHILS # BLD AUTO: 0.04 X10(3) UL (ref 0–0.2)
BASOPHILS NFR BLD AUTO: 0.6 %
BILIRUB SERPL-MCNC: 0.5 MG/DL (ref 0.3–1.2)
BUN BLD-MCNC: 8 MG/DL (ref 9–23)
CALCIUM BLD-MCNC: 10 MG/DL (ref 8.7–10.6)
CHLORIDE SERPL-SCNC: 103 MMOL/L (ref 98–112)
CO2 SERPL-SCNC: 26 MMOL/L (ref 21–32)
CREAT BLD-MCNC: 0.77 MG/DL
DEPRECATED HBV CORE AB SER IA-ACNC: 77 NG/ML
EGFRCR SERPLBLD CKD-EPI 2021: 99 ML/MIN/1.73M2 (ref 60–?)
EOSINOPHIL # BLD AUTO: 0.05 X10(3) UL (ref 0–0.7)
EOSINOPHIL NFR BLD AUTO: 0.8 %
FASTING STATUS PATIENT QL REPORTED: NO
GLOBULIN PLAS-MCNC: 3.2 G/DL (ref 2–3.5)
GLUCOSE BLD-MCNC: 79 MG/DL (ref 70–99)
HCT VFR BLD AUTO: 35.4 %
HGB BLD-MCNC: 11.8 G/DL
IMM GRANULOCYTES # BLD AUTO: 0.02 X10(3) UL (ref 0–1)
IMM GRANULOCYTES NFR BLD: 0.3 %
IRON SATN MFR SERPL: 18 %
IRON SERPL-MCNC: 57 UG/DL
LYMPHOCYTES # BLD AUTO: 2.55 X10(3) UL (ref 1–4)
LYMPHOCYTES NFR BLD AUTO: 40.6 %
MCH RBC QN AUTO: 26.8 PG (ref 26–34)
MCHC RBC AUTO-ENTMCNC: 33.3 G/DL (ref 31–37)
MCV RBC AUTO: 80.5 FL
MONOCYTES # BLD AUTO: 0.38 X10(3) UL (ref 0.1–1)
MONOCYTES NFR BLD AUTO: 6.1 %
NEUTROPHILS # BLD AUTO: 3.24 X10 (3) UL (ref 1.5–7.7)
NEUTROPHILS # BLD AUTO: 3.24 X10(3) UL (ref 1.5–7.7)
NEUTROPHILS NFR BLD AUTO: 51.6 %
OSMOLALITY SERPL CALC.SUM OF ELEC: 279 MOSM/KG (ref 275–295)
PLATELET # BLD AUTO: 294 10(3)UL (ref 150–450)
PLATELET MORPHOLOGY: NORMAL
POTASSIUM SERPL-SCNC: 4 MMOL/L (ref 3.5–5.1)
PROT SERPL-MCNC: 8 G/DL (ref 5.7–8.2)
RBC # BLD AUTO: 4.4 X10(6)UL
SODIUM SERPL-SCNC: 136 MMOL/L (ref 136–145)
TOTAL IRON BINDING CAPACITY: 322 UG/DL (ref 250–425)
TRANSFERRIN SERPL-MCNC: 248 MG/DL (ref 250–380)
WBC # BLD AUTO: 6.3 X10(3) UL (ref 4–11)

## 2025-02-28 RX ORDER — MAGNESIUM 200 MG
1 TABLET ORAL DAILY
COMMUNITY
Start: 2025-01-29

## 2025-02-28 NOTE — PATIENT INSTRUCTIONS
We are recommending for you to start taking oral iron supplements to help keep your levels stable. We recommend HEMA-PLEX (over the counter iron supplements) daily. If you start to experience nausea, abominal pain, or constipation, you can take it every other day.      We will see you back in the office in 3 months for repeat labs and a visit.

## 2025-02-28 NOTE — PROGRESS NOTES
Hematology/Oncology Return Visit Note    Patient Name: Rin Hammond  Medical Record Number: [id number]    YOB: 1980   Date of Service: 2/28/25     Reason for Consultation/Chief Complaint:  iron deficiency anemia  Physician requesting consultation: Tyler Danielson DO    Interval History  2/28/25  She received IV Dextran 1000 mg on 1/17/25.  She does not feel much difference in terms of symptoms after IV iron.   Hb improved significantly from 8.6 to 11.8 g/dL. No longer microcytic.  Remainder of iron studies are pending.   She was advised to take B12 supplements but has not started them yet.     History of Present Illness:  Mrs. Hammond is a 44 year old F with PMHx of iron deficiency anemia (not on treatment) who presents to hematology/oncology today for evaluation and treatment recommendations of anemia.     Labs 10/14/24 show Hb 9.0 g/dL, platelet 439, MCV 72.7, RDW 16, ferritin 2, B12 315. She got two Vit B12 Injections in Dec 2024.   Over the last 2 months she's had 2 episodes of dizziness. Lightheadedness comes abruptly. No syncope. Otherwise she is asymptomatic.     She is vegetarian.     She had menorrhagia before kids, but after delivering she has been having normal menstrual cycles (for 2 years). When she was younger she used to faint.       Past Medical History:  Past Medical History:    Iron deficiency anemia       Past Surgical History:  No past surgical history on file.    Current Outpatient Medications:  Medications Ordered Prior to Encounter[1]    Allergies:   Allergies[2]    Family Medical History:  No family history on file.    Social History:  Social History     Social History Narrative    Not on file     Social History     Socioeconomic History    Marital status:    Tobacco Use    Smoking status: Never    Smokeless tobacco: Never   Substance and Sexual Activity    Alcohol use: Never    Drug use: Never       Review of Systems:  A 10-point ROS was done with pertinent positives  and negatives per the HPI.     ECOG Performance Status: 0    Vital Signs:  Height: 157.5 cm (5' 2.01\") (02/28 1139)  Weight: 62.1 kg (137 lb) (02/28 1139)  BSA (Calculated - sq m): 1.63 sq meters (02/28 1139)  Pulse: 70 (02/28 1139)  BP: 125/78 (02/28 1139)  Temp: 97.3 °F (36.3 °C) (02/28 1139)  Do Not Use - Resp Rate: --  SpO2: 99 % (02/28 1139)  Wt Readings from Last 6 Encounters:   02/28/25 62.1 kg (137 lb)   01/14/25 62.6 kg (138 lb)   10/10/24 61.2 kg (135 lb)   08/24/23 60.8 kg (134 lb)   10/27/21 61.7 kg (136 lb)   08/28/20 58.5 kg (129 lb)       Physical Examination:  General: Well-nourished and well-appearing. No acute distress.  Eyes: EOMI, bilateral conjunctiva normal. Sclera anicteric.  ENT: Oropharynx is clear. Mucous membranes moist.  Lymph Nodes: No cervical or supraclavicular lymphadenopathy.  Respiratory: Lungs clear to auscultation bilaterally.  Cardiovascular: Regular rate and rhythm, no murmurs. No lower extremity edema.   GI: Soft, non-tender, non-distended with normoactive bowel sounds. No hepatosplenomegaly.  Heme: normal capillary refill. No ecchymosis, petechiae, or purpura.  Neurological: Alert and oriented. No apparent focal sensory or motor deficits  Skin: no rashes or lesions.  Psychiatric: Normal mood and affect.    Data Review  Laboratory Studies:  Recent Labs   Lab 02/28/25  1130   WBC 6.3   HGB 11.8*   HCT 35.4   .0   MCV 80.5   NEPRELIM 3.24     Recent Labs   Lab 02/28/25  1130      K 4.0      CO2 26.0   BUN 8*   CREATSERUM 0.77   GLU 79   CA 10.0   TP 8.0   ALB 4.8   ALKPHO 95   AST 19   ALT 14   BILT 0.5     No results for input(s): \"PT\", \"INR\", \"PTT\", \"FIB\" in the last 168 hours.    Assessment:   Mrs. Hammond is a 44 year old F with PMHx of iron deficiency anemia (not on treatment) who presents to hematology/oncology today for evaluation and treatment recommendations of anemia.       Plan:  Iron Deficiency Anemia  Labs 10/14/24 show Hb 9.0 g/dL, platelet 439,  MCV 72.7, RDW 16, ferritin 2, B12 315. She got two Vit B12 Injections in Dec 2024.   Over the last 2 months she's had 2 episodes of dizziness. Otherwise she is asymptomatic.   - She received IV Dextran 1000 mg on 1/17/25.    2/28/25 Today  She does not feel much difference in terms of symptoms after IV iron.   Hb improved significantly from 8.6 to 11.8 g/dL. No longer microcytic.  Remainder of iron studies are pending.   She was advised to take B12 supplements but has not started them yet.     Plan:  Her Hb increased 3 g/dL. Ferritin 77 ng/mL from 2 ng/mL. TIBC 322. I recommend additional 500 mg IV dextran followed by ferrous sulfate 325 mg tablet once daily, every other day.   Encouraged to take daily B12 supplements as prescribed  Screening colonoscopy at age 47  OB/GYN follow up for [facility]   RTC 3 months for follow up      Follow-up: in 3 months for repeat labs (including B12/folate) and visit with me.       Jeanette Sabillon D.O.  [facility]         [1]   Current Outpatient Medications on File Prior to Visit   Medication Sig Dispense Refill    Cyanocobalamin (B-12) 1000 MCG Sublingual SL Tab Place 1 tablet under the tongue daily.      SYNTHROID 50 MCG Oral Tab TAKE 1 TABLET(50 MCG) BY MOUTH BEFORE BREAKFAST (Patient not taking: Reported on 2/28/2025) 90 tablet 0     No current facility-administered medications on file prior to visit.   [2] No Known Allergies

## 2025-02-28 NOTE — PROGRESS NOTES
Pt here for f/u regarding iron deficiency anemia. Pt received IV iron 1/17/2025. Pt reports minimal improvement in her energy levels. No reports of lightheadedness or dizziness. She has not started her vitamin B12 supplementation yet.       Education Record    Learner:  Patient    Disease / Diagnosis: iron deficiency anemia    Barriers / Limitations:  None   Comments:    Method:  Discussion   Comments:    General Topics:  Medication, Side effects and symptom management, and Plan of care reviewed   Comments:    Outcome:  Observed demonstration and Shows understanding   Comments:

## 2025-03-04 ENCOUNTER — TELEPHONE (OUTPATIENT)
Age: 45
End: 2025-03-04

## 2025-03-06 ENCOUNTER — TELEPHONE (OUTPATIENT)
Age: 45
End: 2025-03-06

## 2025-06-03 ENCOUNTER — TELEPHONE (OUTPATIENT)
Age: 45
End: 2025-06-03

## 2025-06-06 ENCOUNTER — APPOINTMENT (OUTPATIENT)
Facility: LOCATION | Age: 45
End: 2025-06-06
Attending: INTERNAL MEDICINE
Payer: COMMERCIAL

## 2025-07-11 ENCOUNTER — OFFICE VISIT (OUTPATIENT)
Age: 45
End: 2025-07-11
Attending: INTERNAL MEDICINE
Payer: COMMERCIAL

## 2025-07-11 VITALS
SYSTOLIC BLOOD PRESSURE: 122 MMHG | OXYGEN SATURATION: 98 % | HEIGHT: 62.01 IN | HEART RATE: 79 BPM | RESPIRATION RATE: 18 BRPM | WEIGHT: 142.5 LBS | DIASTOLIC BLOOD PRESSURE: 79 MMHG | TEMPERATURE: 98 F | BODY MASS INDEX: 25.89 KG/M2

## 2025-07-11 DIAGNOSIS — D50.9 IRON DEFICIENCY ANEMIA, UNSPECIFIED IRON DEFICIENCY ANEMIA TYPE: ICD-10-CM

## 2025-07-11 LAB
ALBUMIN SERPL-MCNC: 4.7 G/DL (ref 3.2–4.8)
ALBUMIN/GLOB SERPL: 1.8 {RATIO} (ref 1–2)
ALP LIVER SERPL-CCNC: 92 U/L (ref 37–98)
ALT SERPL-CCNC: 10 U/L (ref 10–49)
ANION GAP SERPL CALC-SCNC: 7 MMOL/L (ref 0–18)
AST SERPL-CCNC: 15 U/L (ref ?–34)
BASOPHILS # BLD AUTO: 0.03 X10(3) UL (ref 0–0.2)
BASOPHILS NFR BLD AUTO: 0.6 %
BILIRUB SERPL-MCNC: 0.4 MG/DL (ref 0.3–1.2)
BUN BLD-MCNC: 8 MG/DL (ref 9–23)
CALCIUM BLD-MCNC: 9.7 MG/DL (ref 8.7–10.6)
CHLORIDE SERPL-SCNC: 105 MMOL/L (ref 98–112)
CO2 SERPL-SCNC: 25 MMOL/L (ref 21–32)
CREAT BLD-MCNC: 0.79 MG/DL (ref 0.55–1.02)
DEPRECATED HBV CORE AB SER IA-ACNC: 24 NG/ML (ref 50–306)
EGFRCR SERPLBLD CKD-EPI 2021: 95 ML/MIN/1.73M2 (ref 60–?)
EOSINOPHIL # BLD AUTO: 0.07 X10(3) UL (ref 0–0.7)
EOSINOPHIL NFR BLD AUTO: 1.4 %
ERYTHROCYTE [DISTWIDTH] IN BLOOD BY AUTOMATED COUNT: 12 %
FASTING STATUS PATIENT QL REPORTED: NO
GLOBULIN PLAS-MCNC: 2.6 G/DL (ref 2–3.5)
GLUCOSE BLD-MCNC: 99 MG/DL (ref 70–99)
HCT VFR BLD AUTO: 35.1 % (ref 35–48)
HGB BLD-MCNC: 12.2 G/DL (ref 12–16)
IMM GRANULOCYTES # BLD AUTO: 0.01 X10(3) UL (ref 0–1)
IMM GRANULOCYTES NFR BLD: 0.2 %
IRON SATN MFR SERPL: 16 % (ref 15–50)
IRON SERPL-MCNC: 59 UG/DL (ref 50–170)
LYMPHOCYTES # BLD AUTO: 2.11 X10(3) UL (ref 1–4)
LYMPHOCYTES NFR BLD AUTO: 42 %
MCH RBC QN AUTO: 30.7 PG (ref 26–34)
MCHC RBC AUTO-ENTMCNC: 34.8 G/DL (ref 31–37)
MCV RBC AUTO: 88.2 FL (ref 80–100)
MONOCYTES # BLD AUTO: 0.3 X10(3) UL (ref 0.1–1)
MONOCYTES NFR BLD AUTO: 6 %
NEUTROPHILS # BLD AUTO: 2.5 X10 (3) UL (ref 1.5–7.7)
NEUTROPHILS # BLD AUTO: 2.5 X10(3) UL (ref 1.5–7.7)
NEUTROPHILS NFR BLD AUTO: 49.8 %
OSMOLALITY SERPL CALC.SUM OF ELEC: 282 MOSM/KG (ref 275–295)
PLATELET # BLD AUTO: 320 10(3)UL (ref 150–450)
POTASSIUM SERPL-SCNC: 4.3 MMOL/L (ref 3.5–5.1)
PROT SERPL-MCNC: 7.3 G/DL (ref 5.7–8.2)
RBC # BLD AUTO: 3.98 X10(6)UL (ref 3.8–5.3)
SODIUM SERPL-SCNC: 137 MMOL/L (ref 136–145)
TOTAL IRON BINDING CAPACITY: 361 UG/DL (ref 250–425)
TRANSFERRIN SERPL-MCNC: 281 MG/DL (ref 250–380)
WBC # BLD AUTO: 5 X10(3) UL (ref 4–11)

## 2025-07-11 NOTE — PROGRESS NOTES
Pt here for f/u regarding iron deficiency anemia. Pt has been compliant with vitamin B12 daily. She did not schedule her additional 500mg of IV Infed. Pt states her energy levels have been good, no lightheadedness/dizziness or dyspnea reported. No rectal bleeding or blood in the stool. Her periods are light and normal.       Education Record    Learner:  Patient    Disease / Diagnosis: iron deficiency anemia    Barriers / Limitations:  None   Comments:    Method:  Discussion   Comments:    General Topics:  Medication, Side effects and symptom management, and Plan of care reviewed   Comments:    Outcome:  Observed demonstration and Shows understanding   Comments:

## 2025-07-11 NOTE — PROGRESS NOTES
Hematology/Oncology Return Visit Note    Patient Name: Rin Hammond  Medical Record Number: [id number]    YOB: 1980   Date of Service: 7/11/25    Reason for Consultation/Chief Complaint:  iron deficiency anemia  Physician requesting consultation: Tyler Danielson DO    Interval History  TODAY 7/11/25  Patient presents for follow up of iron deficiency anemia.  Overall she feels well.  Hb is now normal at 12.2.  Iron studies pending.   Last B12 is 401.    2/28/25  She received IV Dextran 1000 mg on 1/17/25.  She does not feel much difference in terms of symptoms after IV iron.   Hb improved significantly from 8.6 to 11.8 g/dL. No longer microcytic.  Remainder of iron studies are pending.   She was advised to take B12 supplements but has not started them yet.     Past Medical History:  Past Medical History:    Iron deficiency anemia     Past Surgical History:  History reviewed. No pertinent surgical history.    Current Outpatient Medications:  Medications Ordered Prior to Encounter[1]    Allergies:   Allergies[2]    Family Medical History:  History reviewed. No pertinent family history.    Social History:  Social History     Social History Narrative    Not on file     Social History     Socioeconomic History    Marital status:    Tobacco Use    Smoking status: Never    Smokeless tobacco: Never   Substance and Sexual Activity    Alcohol use: Never    Drug use: Never     Review of Systems:  A 10-point ROS was done with pertinent positives and negatives per the HPI.     ECOG Performance Status: 0    Vital Signs:  Height: 157.5 cm (5' 2.01\") (07/11 0931)  Weight: 64.6 kg (142 lb 8 oz) (07/11 0931)  BSA (Calculated - sq m): 1.66 sq meters (07/11 0931)  Pulse: 79 (07/11 0931)  BP: 122/79 (07/11 0931)  Temp: 98.1 °F (36.7 °C) (07/11 0931)  Do Not Use - Resp Rate: --  SpO2: 98 % (07/11 0931)  Wt Readings from Last 6 Encounters:   07/11/25 64.6 kg (142 lb 8 oz)   02/28/25 62.1 kg (137 lb)   01/14/25 62.6  kg (138 lb)   10/10/24 61.2 kg (135 lb)   08/24/23 60.8 kg (134 lb)   10/27/21 61.7 kg (136 lb)       Physical Examination:  General: Well-nourished and well-appearing. No acute distress.  Eyes: EOMI, bilateral conjunctiva normal. Sclera anicteric.  ENT: Oropharynx is clear. Mucous membranes moist.  Lymph Nodes: No cervical or supraclavicular lymphadenopathy.  Respiratory: Lungs clear to auscultation bilaterally.  Cardiovascular: Regular rate and rhythm, no murmurs. No lower extremity edema.   GI: Soft, non-tender, non-distended with normoactive bowel sounds. No hepatosplenomegaly.  Heme: normal capillary refill. No ecchymosis, petechiae, or purpura.  Neurological: Alert and oriented. No apparent focal sensory or motor deficits  Skin: no rashes or lesions.  Psychiatric: Normal mood and affect.    Data Review  Laboratory Studies:  Recent Labs   Lab 07/11/25  0922   WBC 5.0   HGB 12.2   HCT 35.1   .0   MCV 88.2   RDW 12.0   NEPRELIM 2.50     Recent Labs   Lab 07/11/25  0922      K 4.3      CO2 25.0   BUN 8*   CREATSERUM 0.79   GLU 99   CA 9.7   TP 7.3   ALB 4.7   ALKPHO 92   AST 15   ALT 10   BILT 0.4     No results for input(s): \"PT\", \"INR\", \"PTT\", \"FIB\" in the last 168 hours.    Assessment:   Mrs. Hammond is a 44 year old F with PMHx of iron deficiency anemia (not on treatment) who presents to hematology/oncology today for evaluation and treatment recommendations of anemia.       Plan:  Iron Deficiency Anemia - Resolved  Labs 10/14/24 show Hb 9.0 g/dL, platelet 439, MCV 72.7, RDW 16, ferritin 2, B12 315. She got two Vit B12 Injections in Dec 2024.   Over the last 2 months she's had 2 episodes of dizziness. Otherwise she is asymptomatic.   - She received IV Dextran 1000 mg on 1/17/25.    2/28/25   She does not feel much difference in terms of symptoms after IV iron.   Hb improved significantly from 8.6 to 11.8 g/dL. No longer microcytic.  Remainder of iron studies are pending.   She was advised to  take B12 supplements but has not started them yet    7/11/25  Patient presents for follow up of iron deficiency anemia.  Overall she feels well.  Hb is now normal at 12.2.  Iron studies pending.   Last B12 is 401.    Plan:  No further IV iron indicated at this time.  Take daily multivitamin.  Can discontinue B12 supplements.  Screening colonoscopy at age 47  OB/GYN follow up for C     Follow-up: 6 months for repeat labs (including B12/folate) and visit with me.       Jeanette Sabillon D.O.  [facility]         [1]   Current Outpatient Medications on File Prior to Visit   Medication Sig Dispense Refill    SYNTHROID 50 MCG Oral Tab TAKE 1 TABLET(50 MCG) BY MOUTH BEFORE BREAKFAST (Patient not taking: Reported on 1/14/2025) 90 tablet 0     No current facility-administered medications on file prior to visit.   [2] No Known Allergies

## 2025-07-14 ENCOUNTER — TELEPHONE (OUTPATIENT)
Age: 45
End: 2025-07-14

## 2025-07-17 ENCOUNTER — TELEPHONE (OUTPATIENT)
Age: 45
End: 2025-07-17

## (undated) DIAGNOSIS — E03.9 HYPOTHYROIDISM, UNSPECIFIED TYPE: ICD-10-CM

## (undated) NOTE — LETTER
10/22/21        Rin Hammond  [address] [address]      Dear Mary Martinez,    [address] [facility] records indicate that you have outstanding lab work and or testing that was ordered for you and has not yet been completed:  Orders Placed This Encounter

## (undated) NOTE — LETTER
11/30/21        Rin Hammond  [address] [address]      Dear [facility]ina [facility],    [address] [facility] records indicate that you have outstanding lab work and or testing that was ordered for you and has not yet been completed:  Orders Placed This Encounter